# Patient Record
Sex: FEMALE | Race: WHITE | Employment: OTHER | URBAN - NONMETROPOLITAN AREA
[De-identification: names, ages, dates, MRNs, and addresses within clinical notes are randomized per-mention and may not be internally consistent; named-entity substitution may affect disease eponyms.]

---

## 2020-12-16 ENCOUNTER — OFFICE VISIT (OUTPATIENT)
Dept: OBGYN CLINIC | Age: 70
End: 2020-12-16
Payer: MEDICARE

## 2020-12-16 ENCOUNTER — HOSPITAL ENCOUNTER (OUTPATIENT)
Age: 70
Setting detail: SPECIMEN
Discharge: HOME | End: 2020-12-16
Payer: MEDICARE

## 2020-12-16 VITALS
WEIGHT: 237 LBS | BODY MASS INDEX: 38.09 KG/M2 | DIASTOLIC BLOOD PRESSURE: 74 MMHG | SYSTOLIC BLOOD PRESSURE: 108 MMHG | HEIGHT: 66 IN

## 2020-12-16 PROCEDURE — G0101 CA SCREEN;PELVIC/BREAST EXAM: HCPCS | Performed by: OBSTETRICS & GYNECOLOGY

## 2020-12-16 RX ORDER — METFORMIN HYDROCHLORIDE 500 MG/1
500 TABLET, EXTENDED RELEASE ORAL
COMMUNITY

## 2020-12-16 RX ORDER — ASPIRIN 81 MG/1
TABLET ORAL
COMMUNITY

## 2020-12-16 RX ORDER — ACETAMINOPHEN 160 MG
TABLET,DISINTEGRATING ORAL
COMMUNITY

## 2020-12-16 RX ORDER — FLUCONAZOLE 150 MG/1
150 TABLET ORAL DAILY PRN
Qty: 3 TABLET | Refills: 0 | Status: SHIPPED | OUTPATIENT
Start: 2020-12-16

## 2020-12-16 RX ORDER — PRAVASTATIN SODIUM 20 MG
20 TABLET ORAL DAILY
COMMUNITY

## 2020-12-16 RX ORDER — POLYETHYLENE GLYCOL 3350 17 G/17G
POWDER, FOR SOLUTION ORAL
COMMUNITY

## 2020-12-16 ASSESSMENT — ENCOUNTER SYMPTOMS
SHORTNESS OF BREATH: 0
ABDOMINAL PAIN: 0
CONSTIPATION: 0
DIARRHEA: 0

## 2020-12-16 NOTE — LETTER
[address] [place] [address]. [address] [place] [address]  Phone: [phone]  Fax: [phone]            December 29, 2020    Jed [place]  [address] [place] [id number]      Dear Zhang Tan: The results of your most recent Pap smear are normal. This means that no cancerous or precancerous cells were seen. We recommend that you come back in 1 year for your next routine Pap smear. If you have any questions or concerns, please don't hesitate to call.     Sincerely,        Dr. Collin Yee

## 2020-12-16 NOTE — PROGRESS NOTES
DATE OF VISIT:  20  PATIENT NAME:  Chela Manuel     YOB: 1950    78 y.o. Chief Complaint   Patient presents with    Gynecologic Exam     Pt. needs pap. Last mamm . last colonoscopy . Bone density . Pt. denies concerns. Pt. would like refill of her Diflucan medication. She only uses prn if she feels yeast infecion coming on, usually only after taking an antibiotic. No LMP recorded. Patient is postmenopausal.           Primary Care Physician: Piter Graham    The patient was seen and examined. She has no chiefcomplaint today and is here for her annual exam.  Her bowels are regular. There are no voiding complaints. She denies any bloating. She denies vaginal discharge and was counseled on STD's and the need for barriercontraception.      HPI : Chela Manuel is a 78 y.o. female  who presents today for her annual.    ______________________________________________________________________    OB History    Para Term  AB Living   2 2       2   SAB TAB Ectopic Molar Multiple Live Births             2      # Outcome Date GA Lbr Florentin/2nd Weight Sex Delivery Anes PTL Lv   2 Para            1 Para              Past Medical History:   Diagnosis Date    Acid reflux     Atrial fibrillation (HCC)     Diabetes mellitus ([address]     Hyperlipidemia     Insomnia     uses c-pap  machine    Osteoporosis                                                                    Past Surgical History:   Procedure Laterality Date    BREAST SURGERY Right ,     cystectomy     SECTION      COLONOSCOPY      KNEE ARTHROSCOPY Right 2019    KNEE SURGERY  1998    ACL    TONSILLECTOMY      TUBAL LIGATION       Family History   Problem Relation Age of Onset    Diabetes Paternal Grandfather     Stroke Paternal Grandfather     Heart Disease Father     Kidney Cancer Father     Alzheimer's Disease Mother      Social History     Socioeconomic History  Marital status: Unknown     Spouse name: Not on file    Number of children: Not on file    Years of education: Not on file    Highest education level: Not on file   Occupational History    Not on file   Social Needs    Financial resource strain: Not on file    Food insecurity     Worry: Not on file     Inability: Not on file    Transportation needs     Medical: Not on file     Non-medical: Not on file   Tobacco Use    Smoking status: Never Smoker    Smokeless tobacco: Never Used   Substance and Sexual Activity    Alcohol use: Not on file    Drug use: Never    Sexual activity: Not on file   Lifestyle    Physical activity     Days per week: Not on file     Minutes per session: Not on file    Stress: Not on file   Relationships    Social connections     Talks on phone: Not on file     Gets together: Not on file     Attends Voodoo service: Not on file     Active member of club or organization: Not on file     Attends meetings of clubs or organizations: Not on file     Relationship status: Not on file    Intimate partner violence     Fear of current or ex partner: Not on file     Emotionally abused: Not on file     Physically abused: Not on file     Forced sexual activity: Not on file   Other Topics Concern    Not on file   Social History Narrative    Not on file     Vitals:    12/16/20 1359   BP: 108/74   Site: Right Upper Arm   Position: Sitting   Weight: 237 lb (107.5 kg)   Height: 5' 6\" (1.676 m)     Body mass index is 38.25 kg/m². No LMP recorded.  Patient is postmenopausal.    MEDICATIONS:  Current Outpatient Medications   Medication Sig Dispense Refill    PROPRANOLOL HCL PO Take by mouth      polyethylene glycol (GLYCOLAX) 17 GM/SCOOP powder POLYETHYLENE GLYCOL 3350 Miralax 17 gram/dose oral powder take 17 gram mixed with 8 oz. water, juice, soda, coffee or tea by oral route once daily   [facility] ([id number])      PANTOPRAZOLE SODIUM PO Take by mouth  pravastatin (PRAVACHOL) 20 MG tablet Take 20 mg by mouth daily      aspirin 81 MG EC tablet Aspirin aspirin 81 mg oral tablet,delayed release (DR/EC) take 1 tablet (81 mg) by oral route once daily   [facility] ([id number])      metFORMIN (GLUCOPHAGE-XR) 500 MG extended release tablet Take 500 mg by mouth daily (with breakfast)      Alendronate Sodium (FOSAMAX PO) Take by mouth      MULTIPLE VITAMIN PO One-A-Day Womens Formula 18 mg iron-400 mcg-500 mg Ca oral tablet One-A-Day Womens Formula 18 mg iron-400 mcg-500 mg Ca oral tablet take 1 tablet by oral route daily   [facility] ([id number])      Cholecalciferol (VITAMIN D3) 50 MCG (2000 UT) CAPS Take by mouth      fluconazole (DIFLUCAN) 150 MG tablet Take 1 tablet by mouth daily as needed (after antibiotics) 3 tablet 0     No current facility-administered medications for this visit. ALLERGIES:  Allergies as of 12/16/2020 - Review Complete 12/16/2020   Allergen Reaction Noted    Albuterol  12/16/2020    Metoprolol  12/16/2020    Other  12/11/2020    Verapamil  12/16/2020           Symptoms of decreased mood absent    **If either question is answered in a  positive fashion then completethe PHQ9 Scoring Evaluation and make the appropriate referral**      Gynecologic History:       No LMP recorded. Patient is postmenopausal.    Sexually Active: No    STD History: No     Hormone Replacement Exposure: No      Genetic Qualified Family History of Breast, Ovarian , Colon or Uterine Cancer:No   If YES see scanned worksheet. Preventative Health Testing:  Colposcopy History:   Date of Last Mammogram: 2020  Date of Last Colonoscopy:   Date of Last Bone Density:      ______________________________________________________________________    REVIEW OF SYSTEMS:       Review of Systems   Constitutional: Negative for chills, fatigue and fever. Respiratory: Negative for shortness of breath. Return in about 2 years (around 12/16/2022) for annual.  No orders of the defined types were placed in this encounter. Repeat Annual every 1 year  Cervical Cytology Evaluation begins at 24years old. If Negative Cytology, Follow-up screening per current guidelines. Mammograms every 1year. If 35 yo and last mammogram was negative. Calcium and Vitamin D dosing reviewed. Colonoscopy screening reviewed as well as onset for bone density testing. Birth control and barrier recommendationsdiscussed. STD counseling and prevention reviewed. Gardisil counseling completed for all patients 7-33 yo. Routine healthmaintenance per patients PCP.     Electronicallysigned by:  Emmanuel Cummins DO on 12/16/20

## 2020-12-21 LAB
HPV SOURCE: NORMAL
HPV, GENOTYPE 16: NOT DETECTED
HPV, GENOTYPE 18: NOT DETECTED
HPV, HIGH RISK OTHER: NOT DETECTED

## 2020-12-29 LAB — CYTOLOGY REPORT: NORMAL

## 2023-01-04 ENCOUNTER — OFFICE VISIT (OUTPATIENT)
Dept: OBGYN CLINIC | Age: 73
End: 2023-01-04
Payer: MEDICARE

## 2023-01-04 VITALS
WEIGHT: 250 LBS | BODY MASS INDEX: 40.18 KG/M2 | HEIGHT: 66 IN | SYSTOLIC BLOOD PRESSURE: 122 MMHG | DIASTOLIC BLOOD PRESSURE: 76 MMHG

## 2023-01-04 DIAGNOSIS — Z01.419 WOMEN'S ANNUAL ROUTINE GYNECOLOGICAL EXAMINATION: Primary | ICD-10-CM

## 2023-01-04 PROCEDURE — G8419 CALC BMI OUT NRM PARAM NOF/U: HCPCS | Performed by: OBSTETRICS & GYNECOLOGY

## 2023-01-04 PROCEDURE — G0101 CA SCREEN;PELVIC/BREAST EXAM: HCPCS | Performed by: OBSTETRICS & GYNECOLOGY

## 2023-01-04 PROCEDURE — G8427 DOCREV CUR MEDS BY ELIG CLIN: HCPCS | Performed by: OBSTETRICS & GYNECOLOGY

## 2023-01-04 RX ORDER — FLUCONAZOLE 150 MG/1
150 TABLET ORAL DAILY PRN
Qty: 3 TABLET | Refills: 0 | Status: SHIPPED | OUTPATIENT
Start: 2023-01-04

## 2023-01-04 ASSESSMENT — ENCOUNTER SYMPTOMS
SHORTNESS OF BREATH: 0
CONSTIPATION: 0
ABDOMINAL PAIN: 0
DIARRHEA: 0

## 2023-01-04 NOTE — PROGRESS NOTES
YEARLY PHYSICAL    Date of service: 2023    Arleth Souza  Is a 81 y.o.   female    PT's PCP is: Hendrick Meckel     : 1950                                         Chaperone for Intimate Exam  Chaperone was offered as part of the rooming process. Patient declined and agrees to continue with exam without a chaperone. Chaperone: N/A      Subjective:       No LMP recorded.  Patient is postmenopausal.     Are your menses regular: not applicable    OB History    Para Term  AB Living   2 2       2   SAB IAB Ectopic Molar Multiple Live Births             2      # Outcome Date GA Lbr Florentin/2nd Weight Sex Delivery Anes PTL Lv   2 Para            1 Para                 Social History     Tobacco Use   Smoking Status Never   Smokeless Tobacco Never        Social History     Substance and Sexual Activity   Alcohol Use None       Family History   Problem Relation Age of Onset    Diabetes Paternal Grandfather     Stroke Paternal Grandfather     Heart Disease Father     Kidney Cancer Father     Alzheimer's Disease Mother        Any family history of breast or ovarian cancer: No    Any family history of blood clots: No      Allergies: Albuterol, Metoprolol, Other, and Verapamil      Current Outpatient Medications:     BLACK COHOSH PO, Take by mouth, Disp: , Rfl:     fluconazole (DIFLUCAN) 150 MG tablet, Take 1 tablet by mouth daily as needed (after antibiotics), Disp: 3 tablet, Rfl: 0    PROPRANOLOL HCL PO, Take by mouth, Disp: , Rfl:     polyethylene glycol (GLYCOLAX) 17 GM/SCOOP powder, POLYETHYLENE GLYCOL 3350 Miralax 17 gram/dose oral powder take 17 gram mixed with 8 oz. water, juice, soda, coffee or tea by oral route once daily   [facility] ([id number]), Disp: , Rfl:     PANTOPRAZOLE SODIUM PO, Take by mouth, Disp: , Rfl:     pravastatin (PRAVACHOL) 20 MG tablet, Take 20 mg by mouth daily, Disp: , Rfl: aspirin 81 MG EC tablet, Aspirin aspirin 81 mg oral tablet,delayed release (DR/EC) take 1 tablet (81 mg) by oral route once daily   [facility] ([id number]), Disp: , Rfl:     metFORMIN (GLUCOPHAGE-XR) 500 MG extended release tablet, Take 500 mg by mouth daily (with breakfast), Disp: , Rfl:     Alendronate Sodium (FOSAMAX PO), Take by mouth, Disp: , Rfl:     MULTIPLE VITAMIN PO, One-A-Day Womens Formula 18 mg iron-400 mcg-500 mg Ca oral tablet One-A-Day Womens Formula 18 mg iron-400 mcg-500 mg Ca oral tablet take 1 tablet by oral route daily   [facility] ([id number]), Disp: , Rfl:     Cholecalciferol (VITAMIN D3) 50 MCG (2000) CAPS, Take by mouth, Disp: , Rfl:     Social History     Substance and Sexual Activity   Sexual Activity Not on file       Any bleeding or pain with intercourse: na    Last Yearly:  2020    Last pap: 2020 NL    Last HPV: 2020 NEG    Last Mammogram: 10-6-2022    Last Dexascan     Last colorectal screen- type:colonoscopy  date  2018    Do you do self breast exams: No    Past Medical History:   Diagnosis Date    Acid reflux     Atrial fibrillation ([address]     Diabetes mellitus ([address]     History of DVT (deep vein thrombosis)     post-op knee surgery    Hyperlipidemia     Insomnia     uses c-pap  machine    Osteoporosis        Past Surgical History:   Procedure Laterality Date    BREAST SURGERY Right ,     cystectomy     SECTION      COLONOSCOPY      KNEE ARTHROSCOPY Right 2019    KNEE SURGERY  1998    ACL    TONSILLECTOMY      TUBAL LIGATION         Family History   Problem Relation Age of Onset    Diabetes Paternal Grandfather     Stroke Paternal Grandfather     Heart Disease Father     Kidney Cancer Father     Alzheimer's Disease Mother        Chief Complaint   Patient presents with    Annual Exam     Patient denies concerns.  Patient requests refills of Diflucan rx #3 to keep on hand to use after an antibiotic. PE:  Vital Signs  Blood pressure 122/76, height 5' 5.5\" (1.664 m), weight 250 lb (113.4 kg). Estimated body mass index is 40.97 kg/m² as calculated from the following:    Height as of this encounter: 5' 5.5\" (1.664 m). Weight as of this encounter: 250 lb (113.4 kg). Labs:    No results found for this visit on 01/04/23. No data recorded    NURSE: Laurne Lowery    HPI: here for annual exam    Review of Systems   Constitutional:  Negative for chills, fatigue and fever. Respiratory:  Negative for shortness of breath. Cardiovascular:  Negative for chest pain. Gastrointestinal:  Negative for abdominal pain, constipation and diarrhea. Genitourinary:  Negative for dysuria, enuresis, frequency, menstrual problem, pelvic pain, urgency and vaginal bleeding. Neurological:  Negative for dizziness, light-headedness and headaches. Physical Exam  Constitutional:       Appearance: Normal appearance. Genitourinary:      Vulva, bladder and urethral meatus normal.      Right Labia: No rash or lesions. Left Labia: No lesions or rash. No labial fusion noted. No vaginal discharge. No vaginal prolapse present. No vaginal atrophy present. Right Adnexa: not tender and no mass present. Left Adnexa: not tender and no mass present. No cervical motion tenderness, discharge, friability or lesion. No parametrium nodularity present. Uterus is not enlarged or tender. Breasts:     Breasts are soft. Right: No inverted nipple, mass, nipple discharge, skin change or tenderness. Left: No inverted nipple, mass, nipple discharge, skin change or tenderness. HENT:      Head: Normocephalic and atraumatic. Eyes:      Extraocular Movements: Extraocular movements intact. Pupils: Pupils are equal, round, and reactive to light. Cardiovascular:      Rate and Rhythm: Normal rate.    Pulmonary:      Effort: Pulmonary effort is normal.   Abdominal: General: There is no distension. Palpations: Abdomen is soft. There is no mass. Tenderness: There is no abdominal tenderness. Musculoskeletal:         General: Normal range of motion. Cervical back: Normal range of motion. Neurological:      General: No focal deficit present. Mental Status: She is alert and oriented to person, place, and time. Skin:     General: Skin is warm and dry. Psychiatric:         Mood and Affect: Mood normal.         Behavior: Behavior normal.         Thought Content: Thought content normal.         Judgment: Judgment normal.                                 Assessment and Plan          Diagnosis Orders   1. Women's annual routine gynecological examination            Repeat Annual every 1 year  Cervical Cytology Evaluation begins at 24years old. If Negative Cytology, Follow-up screening per current guidelines. Mammograms every 1year. If 37 yo and last mammogram was negative. Routine healthmaintenance per patients PCP. I am having Arleth Souza maintain her PROPRANOLOL HCL PO, polyethylene glycol, PANTOPRAZOLE SODIUM PO, pravastatin, aspirin, metFORMIN, Alendronate Sodium (FOSAMAX PO), MULTIPLE VITAMIN PO, Vitamin D3, BLACK COHOSH PO, and fluconazole. Return in about 2 years (around 1/4/2025) for annual.    She was also counseled on her preventative health maintenance recommendations and follow-up. There are no Patient Instructions on file for this visit.     Brook Elizondo DO,1/4/2023 1:22 PM

## 2023-03-06 ENCOUNTER — TELEPHONE (OUTPATIENT)
Dept: OBGYN CLINIC | Age: 73
End: 2023-03-06

## 2023-03-06 NOTE — TELEPHONE ENCOUNTER
Patient called. She was given 3 Diflucan at her appointment in Jan.  She has used all of them due to antibiotics she was on for bronchitis . She is requesting refills with a stronger dose because they did not help. Advised patient that we needed to schedule an appointment to evaluate before giving more, especially since medication not working to make sure right thing is being treated. Patient really didn't want to come in but scheduled for tomorrow with Paulina Jarrett.

## 2023-03-07 ENCOUNTER — OFFICE VISIT (OUTPATIENT)
Dept: OBGYN CLINIC | Age: 73
End: 2023-03-07

## 2023-03-07 ENCOUNTER — HOSPITAL ENCOUNTER (OUTPATIENT)
Age: 73
Setting detail: SPECIMEN
Discharge: HOME | End: 2023-03-07

## 2023-03-07 VITALS — WEIGHT: 241.6 LBS | SYSTOLIC BLOOD PRESSURE: 130 MMHG | DIASTOLIC BLOOD PRESSURE: 76 MMHG | BODY MASS INDEX: 39.59 KG/M2

## 2023-03-07 DIAGNOSIS — N89.8 VAGINAL ITCHING: Primary | ICD-10-CM

## 2023-03-07 RX ORDER — FLUCONAZOLE 150 MG/1
150 TABLET ORAL DAILY PRN
Qty: 3 TABLET | Refills: 0 | Status: SHIPPED | OUTPATIENT
Start: 2023-03-07

## 2023-03-07 RX ORDER — CLOBETASOL PROPIONATE 0.5 MG/G
CREAM TOPICAL
Qty: 45 G | Refills: 1 | Status: SHIPPED | OUTPATIENT
Start: 2023-03-07

## 2023-03-08 DIAGNOSIS — N89.8 VAGINAL ITCHING: ICD-10-CM

## 2023-03-08 LAB
CANDIDA SPECIES, DNA PROBE: NEGATIVE
GARDNERELLA VAGINALIS, DNA PROBE: NEGATIVE
SOURCE: NORMAL
TRICHOMONAS VAGINALIS DNA: NEGATIVE

## 2023-03-10 NOTE — PROGRESS NOTES
DATE OF VISIT:  3/10/23    PATIENT NAME:  Verónica Rosa     YOB: 1950    REASON FOR VISIT:    Chief Complaint   Patient presents with    Vaginal Itching     Was recently treated with antibiotic and she thinks she now has a yeast infection. Having issues with vaginal itching and slight odor. Denies discharge. HISTORY OF PRESENT ILLNESS:  Patient presents with vaginal itching. She typically gets yeast infections after ABX. Recently took ABX and then took 1 diflucan x 3 days and itching has persisted. Also has noticed slight odor. Reports that itching is actually slightly better today. Discussed that if itching were from yeast it would have likely cleared with Diflucan. Discussed trial of clobetasol cream to help clear up irritation. She also requests another RX of Diflucan to have on hand in case she is placed on another ABX. No LMP recorded. Patient is postmenopausal.  Vitals:    03/07/23 1234   BP: 130/76   Weight: 241 lb 9.6 oz (109.6 kg)     Body mass index is 39.59 kg/m². Allergies   Allergen Reactions    Albuterol     Metoprolol      Other reaction(s): severe constipation, severe constipation    Other      darvocet    Verapamil      Other reaction(s): severe constipation, severe constipation     Current Outpatient Medications   Medication Sig Dispense Refill    cyanocobalamin 1000 MCG tablet Take by mouth      fluconazole (DIFLUCAN) 150 MG tablet Take 1 tablet by mouth daily as needed (after antibiotics) 3 tablet 0    clobetasol (TEMOVATE) 0.05 % cream Apply topically 2 times daily for 2 weeks, then once daily for 2 weeks, then 2-3 times weekly.  45 g 1    BLACK COHOSH PO Take by mouth      PROPRANOLOL HCL PO Take by mouth      polyethylene glycol (GLYCOLAX) 17 GM/SCOOP powder POLYETHYLENE GLYCOL 3350 Miralax 17 gram/dose oral powder take 17 gram mixed with 8 oz. water, juice, soda, coffee or tea by oral route once daily   [facility] ([id number]) PANTOPRAZOLE SODIUM PO Take by mouth      pravastatin (PRAVACHOL) 20 MG tablet Take 20 mg by mouth daily      aspirin 81 MG EC tablet Aspirin aspirin 81 mg oral tablet,delayed release (DR/EC) take 1 tablet (81 mg) by oral route once daily   [facility] ([id number])      metFORMIN (GLUCOPHAGE-XR) 500 MG extended release tablet Take 500 mg by mouth daily (with breakfast)      Alendronate Sodium (FOSAMAX PO) Take by mouth      MULTIPLE VITAMIN PO One-A-Day Womens Formula 18 mg iron-400 mcg-500 mg Ca oral tablet One-A-Day Womens Formula 18 mg iron-400 mcg-500 mg Ca oral tablet take 1 tablet by oral route daily   [facility] ([id number])      Cholecalciferol (VITAMIN D3) 50 MCG (2000 UT) CAPS Take by mouth       No current facility-administered medications for this visit. Social History     Socioeconomic History    Marital status: Unknown   Tobacco Use    Smoking status: Never    Smokeless tobacco: Never   Substance and Sexual Activity    Drug use: Never       REVIEW OF SYSTEMS:  Review of Systems   Constitutional:  Negative for chills, fatigue and fever. Genitourinary:  Negative for dysuria, frequency, menstrual problem, pelvic pain, vaginal bleeding, vaginal discharge and vaginal pain (+itching). PHYSICAL EXAM:  /76   Wt 241 lb 9.6 oz (109.6 kg)   BMI 39.59 kg/m²   Physical Exam  Constitutional:       Appearance: Normal appearance. Genitourinary:      Right Labia: No rash, tenderness or lesions. Left Labia: No tenderness, lesions or rash. Vaginal erythema present. No vaginal discharge, tenderness or bleeding. HENT:      Head: Normocephalic and atraumatic. Mouth/Throat:      Mouth: Mucous membranes are moist.   Eyes:      Extraocular Movements: Extraocular movements intact. Musculoskeletal:         General: Normal range of motion. Cervical back: Normal range of motion. Neurological:      General: No focal deficit present. Mental Status: She is alert and oriented to person, place, and time. Skin:     General: Skin is warm and dry. Psychiatric:         Mood and Affect: Mood normal.         Behavior: Behavior normal.         Thought Content: Thought content normal.     The patient, Karen Katz is a 81 y.o. female, was seen with a total time spent of 20 minutes for the visit on this date of service by the E/M provider. The time component had both face to face and non face to face time spent in determining the total time component. Counseling and education regarding her diagnosis listed below and her options regarding those diagnoses were also included in determining her time component. The patient had her preventative health maintenance recommendations and follow-up reviewed with her at the completion of her visit. ASSESSMENT:  1. Vaginal itching        PLAN:  Orders Placed This Encounter   Procedures    Vaginitis DNA Probe     Return if symptoms worsen or fail to improve.        Electronically signed by Meet Adams PA-C on 03/10/23

## 2025-02-18 RX ORDER — PROPRANOLOL HYDROCHLORIDE 10 MG/1
10 TABLET ORAL 2 TIMES DAILY
COMMUNITY

## 2025-02-18 RX ORDER — PANTOPRAZOLE SODIUM 40 MG/1
40 TABLET, DELAYED RELEASE ORAL DAILY
COMMUNITY

## 2025-02-18 RX ORDER — ALENDRONATE SODIUM 70 MG/1
70 TABLET ORAL
COMMUNITY

## 2025-02-18 RX ORDER — ASPIRIN 81 MG/1
81 TABLET ORAL DAILY
COMMUNITY

## 2025-02-18 NOTE — PROGRESS NOTES
NPO after midnight   Bring eye drops that were prescribed for surgery  Bring insurance info and drivers license  Wear comfortable clean clothing, button down top  Do not bring jewelry  Shower night before and morning of surgery with a liquid antibacterial soap  Bring list of medications with dosage and how often taken  Follow all instructions given by your physician   needed at discharge  Call [phone] for any questions

## 2025-02-18 NOTE — PROGRESS NOTES
In preparation for their surgical procedure above patient was screened for Obstructive Sleep Apnea (TOÑITO) using the STOP-Bang Questionnaire by the Pre-Admission Testing department.  This is a pre-surgical screening tool for patient safety and serves as a recommendation, this WILL NOT cause cancellation of surgery.    STOP-Bang Questionnaire  * Do you currently see a pulmonologist?  No     If yes STOP, do not complete.  Patient follows with Dr.     1.  Do you snore loudly (able to be heard in the next room)?      No    2.  Do you often feel tired or sleepy during the daytime?          No       3.  Has anyone ever told you that you stop breathing during your sleep?       No    4.  Do you have or are you being treated for high blood pressure?          No      5.  BMI more than 35?  BMI (Calculated): 40.6        Yes    6.  Age over 50 years? 74 y.o.      Yes    7.  Neck Circumference greater than 17 inches for male or 16 inches for female?  Measured           (visits only)            Not Applicable    8.  Gender Male?                 No      TOTAL SCORE: 2    TOÑITO - Low Risk : Yes to 0 - 2 questions  TOÑITO - Intermediate Risk : Yes to 3 - 4 questions  TOÑITO - High Risk : Yes to 5 - 8 questions    Adapted from:   STOP Questionnaire: A Tool to Screen Patients for Obstructive Sleep Apnea   JAY Garrett.R.C.P.C., Cheng Avila M.B.B.S., Daniel Haines M.D., Lali Whelan, Ph.D., SANDRA Lopez.B.B.S., SANDRA Sandy.Sc., Dalton Mendenhall M.D., Sean Rich F.R.C.P.C.   Anesthesiology 2008; 108:812-21 Copyright 2008, the American Society of Anesthesiologists, Inc. [employer]   ----------------------------------------------------------------------------------------------------------------

## 2025-02-21 NOTE — DISCHARGE INSTRUCTIONS
Cataract Post-Operative Care Instructions     How to Instill you Eye Drops:    Wash your hands before instilling drops   Shake eye drop bottle putting one drop in the surgical eye   The dropper tip should not touch the eyelashes or the eye.  Your head should be tilted back, look up and lower the eyelid pulled down from the cheekbone to form a pocket for the eye drop.       Daily Eye Drop Schedule:    Remove the eye patch as directed by the recovery room nurse.  Apply 1 drop of Ofloxacin three times today, then four times a day starting tomorrow for one week.  Lay a warm, clean and moist washcloth for 5 minutes on operative eye.   Apply 1 drop of Lotemax two times today, and then starting tomorrow three times a day for one week, two times a day for one week and then once a day for one week.  Lay a warm, clean and moist washcloth for 5 minutes on operative eye.    Apply 1 drop of Prolensa one time a day starting tomorrow for two weeks.  Lay a warm, clean and moist washcloth for 5 minutes on operative eye.      Care at Home:     Wear a shield at bedtime for one week following your eye surgery.   NEVER RUB YOUR OPERATIVE EYE!  Use of the operative eye is NOT harmful.   Your vision may be blurry with your present glasses.  Take your glasses to your follow-up appointment the day after surgery.  You will receive your new glasses prescription approximately 4-5 weeks following surgery.    You may do everything to care for yourself.   You may sleep on your back or either side after surgery, but NOT FACE DOWN ON YOUR STOMACH.    NO LIFTING OVER 10 POUNDS.   No outdoor work until your doctor tells you that it is OK.   Light work, including stooping over is not harmful.   If you have glaucoma, continue your normal use of the glaucoma drops.   Do not submerge head under water (Hot tub/swimming pool)    Please call office if any problem arise at [phone] [phone].    I have had the opportunity to ask questions and have

## 2025-02-24 ENCOUNTER — ANESTHESIA (OUTPATIENT)
Dept: OPERATING ROOM | Age: 75
End: 2025-02-24
Payer: MEDICARE

## 2025-02-24 ENCOUNTER — ANESTHESIA EVENT (OUTPATIENT)
Dept: OPERATING ROOM | Age: 75
End: 2025-02-24
Payer: MEDICARE

## 2025-02-24 ENCOUNTER — HOSPITAL ENCOUNTER (OUTPATIENT)
Age: 75
Setting detail: OUTPATIENT SURGERY
Discharge: HOME | End: 2025-02-24
Attending: OPHTHALMOLOGY | Admitting: OPHTHALMOLOGY
Payer: MEDICARE

## 2025-02-24 VITALS
RESPIRATION RATE: 16 BRPM | DIASTOLIC BLOOD PRESSURE: 63 MMHG | BODY MASS INDEX: 40.29 KG/M2 | SYSTOLIC BLOOD PRESSURE: 130 MMHG | TEMPERATURE: 96.3 F | WEIGHT: 236 LBS | HEIGHT: 64 IN | HEART RATE: 65 BPM | OXYGEN SATURATION: 98 %

## 2025-02-24 LAB — GLUCOSE BLD STRIP.AUTO-MCNC: 130 MG/DL (ref 70–108)

## 2025-02-24 PROCEDURE — 6360000002 HC RX W HCPCS: Performed by: NURSE ANESTHETIST, CERTIFIED REGISTERED

## 2025-02-24 PROCEDURE — 7100000011 HC PHASE II RECOVERY - ADDTL 15 MIN: Performed by: OPHTHALMOLOGY

## 2025-02-24 PROCEDURE — V2632 POST CHMBR INTRAOCULAR LENS: HCPCS | Performed by: OPHTHALMOLOGY

## 2025-02-24 PROCEDURE — 2709999900 HC NON-CHARGEABLE SUPPLY: Performed by: OPHTHALMOLOGY

## 2025-02-24 PROCEDURE — 3600000013 HC SURGERY LEVEL 3 ADDTL 15MIN: Performed by: OPHTHALMOLOGY

## 2025-02-24 PROCEDURE — 3600000003 HC SURGERY LEVEL 3 BASE: Performed by: OPHTHALMOLOGY

## 2025-02-24 PROCEDURE — 6360000002 HC RX W HCPCS: Performed by: OPHTHALMOLOGY

## 2025-02-24 PROCEDURE — 3700000000 HC ANESTHESIA ATTENDED CARE: Performed by: OPHTHALMOLOGY

## 2025-02-24 PROCEDURE — 2500000003 HC RX 250 WO HCPCS: Performed by: OPHTHALMOLOGY

## 2025-02-24 PROCEDURE — 7100000010 HC PHASE II RECOVERY - FIRST 15 MIN: Performed by: OPHTHALMOLOGY

## 2025-02-24 PROCEDURE — 3700000001 HC ADD 15 MINUTES (ANESTHESIA): Performed by: OPHTHALMOLOGY

## 2025-02-24 PROCEDURE — 6370000000 HC RX 637 (ALT 250 FOR IP): Performed by: OPHTHALMOLOGY

## 2025-02-24 PROCEDURE — 82948 REAGENT STRIP/BLOOD GLUCOSE: CPT

## 2025-02-24 DEVICE — LENS CLAREON IOL 15.5D: Type: IMPLANTABLE DEVICE | Site: EYE | Status: FUNCTIONAL

## 2025-02-24 RX ORDER — MIDAZOLAM HYDROCHLORIDE 1 MG/ML
INJECTION, SOLUTION INTRAMUSCULAR; INTRAVENOUS
Status: DISCONTINUED | OUTPATIENT
Start: 2025-02-24 | End: 2025-02-24 | Stop reason: SDUPTHER

## 2025-02-24 RX ORDER — LIDOCAINE HYDROCHLORIDE 10 MG/ML
INJECTION, SOLUTION EPIDURAL; INFILTRATION; INTRACAUDAL; PERINEURAL PRN
Status: DISCONTINUED | OUTPATIENT
Start: 2025-02-24 | End: 2025-02-24 | Stop reason: ALTCHOICE

## 2025-02-24 RX ORDER — FENTANYL CITRATE 50 UG/ML
INJECTION, SOLUTION INTRAMUSCULAR; INTRAVENOUS
Status: DISCONTINUED | OUTPATIENT
Start: 2025-02-24 | End: 2025-02-24 | Stop reason: SDUPTHER

## 2025-02-24 RX ORDER — BUPIVACAINE HYDROCHLORIDE 7.5 MG/ML
1 INJECTION, SOLUTION EPIDURAL; RETROBULBAR
Status: COMPLETED | OUTPATIENT
Start: 2025-02-24 | End: 2025-02-24

## 2025-02-24 RX ORDER — SODIUM CHLORIDE 0.9 % (FLUSH) 0.9 %
5-40 SYRINGE (ML) INJECTION 2 TIMES DAILY
Status: DISCONTINUED | OUTPATIENT
Start: 2025-02-24 | End: 2025-02-24 | Stop reason: HOSPADM

## 2025-02-24 RX ORDER — BALANCED SALT SOLUTION 6.4; .75; .48; .3; 3.9; 1.7 MG/ML; MG/ML; MG/ML; MG/ML; MG/ML; MG/ML
SOLUTION OPHTHALMIC PRN
Status: DISCONTINUED | OUTPATIENT
Start: 2025-02-24 | End: 2025-02-24 | Stop reason: ALTCHOICE

## 2025-02-24 RX ADMIN — BUPIVACAINE HYDROCHLORIDE 0.38 MG: 7.5 INJECTION, SOLUTION EPIDURAL; RETROBULBAR at 07:46

## 2025-02-24 RX ADMIN — Medication 1 DROP: at 07:46

## 2025-02-24 RX ADMIN — Medication 1 DROP: at 07:51

## 2025-02-24 RX ADMIN — Medication 1 DROP: at 07:56

## 2025-02-24 RX ADMIN — FENTANYL CITRATE 50 MCG: 50 INJECTION, SOLUTION INTRAMUSCULAR; INTRAVENOUS at 08:49

## 2025-02-24 RX ADMIN — MIDAZOLAM 2 MG: 1 INJECTION INTRAMUSCULAR; INTRAVENOUS at 08:49

## 2025-02-24 ASSESSMENT — PAIN - FUNCTIONAL ASSESSMENT
PAIN_FUNCTIONAL_ASSESSMENT: 0-10
PAIN_FUNCTIONAL_ASSESSMENT: NONE - DENIES PAIN

## 2025-02-24 NOTE — PROGRESS NOTES
1 DROP PROPARACAINE TO OPERATIVE EYE PRIOR TO PREP     0.1ML VIGAMOX TO LEFT EYE AT END OF PROCEDURE BY DR. ZAMORA     1 DROP MOXIFLOXACIN AND 1 DROP LOTEMAX TO LEFT EYE AT END OF PROCEDURE     CDE 3.58       EYE SHIELD SECURED OVER OPERATIVE EYE WITH PAPER TAPE

## 2025-02-24 NOTE — OP NOTE
[facility]  RECORD OF OPERATION                       2025    Patient: Kim Mast  : 1950  Acct#: [id number]    PRE-OPERATIVE DIAGNOSIS:  Cataract, OS    POST-OPERATIVE DIAGNOSIS:  same    OPERATION PERFORMED:  Phacoemulsification cataract extraction with posterior chamber intraocular lens, OS    MODIFIERS: None    SURGEON:  Pepe Lopez MD    ANESTHESIA:  Topical/MAC    COMPLICATIONS:  None    LENS INFORMATION:SY60WF +15.5 (SN:[id number])    CDE: 3.58    INDICATION AND CONSENT:  The patient was found to have a visually significant cataract affecting their activities of daily living which is not adequately correctable by a change in spectacles.  The risks and options of cataract surgery including observation were discussed.  Consent was obtained and the patient requests to proceed.    OPERATIVE TECHNIQUE: In the preoperative area, the patient was prepared for surgery including dilation of the operative eye.  The patient was then taken to the operating room, the operative eye was prepped and draped in the usual sterile ophthalmic fashion.  A final timeout was performed to confirm the correct patient, site, side, lens, and procedure.  A lid speculum was inserted.  A paracentesis incision was made at the limbus in a position comfortable for the non-dominate hand.  0.2-0.4cc of 1% lidocaine was instilled into the anterior chamber followed by 0.2-0.4cc of Omidria. Viscoelastic was instilled into the anterior chamber. A keratome was used to produce a clear corneal incision at the temporal limbus.  A capsulorrhexis-type capsulotomy was performed.  Hydrodissection was performed balanced salt solution (BSS).  The lens nucleus was phacoemulsified. The lens cortical material was aspirated using the automated irrigation/aspiration unit (I/A). Additional viscoelastic was instilled into the anterior segment and capsular bag. An intraocular lens was introduced and centered

## 2025-02-24 NOTE — PROGRESS NOTES
0911 To recovery via chair. Spont resp. VSS. Report received from surgical rn. IV capped. Denies pain or nausea. Clear eye patch in place to left eye. Snack and drink given. Call bell in reach.  in room  0925 IV discontinued. Up to dress self  0930 Discharge instructions given to pt and  with each voicing understanding.   0945 Discharge to home in stable ambulatory condition with

## 2025-02-24 NOTE — ANESTHESIA PRE PROCEDURE
Department of Anesthesiology  Preprocedure Note       Name:  Kim Mast   Age:  74 y.o.  :  1950                                          MRN:  [id number]         Date:  2025      Surgeon: Surgeon(s):  Pepe Lopez MD    Procedure: Procedure(s):  LEFT EYE CATARACT EMULSIFICATION INTRAOCULAR LENS IMPLANT    Medications prior to admission:   Prior to Admission medications    Medication Sig Start Date End Date Taking? Authorizing Provider   propranolol (INDERAL) 10 MG tablet Take 1 tablet by mouth 2 times daily   Yes Daja Fernandez MD   pantoprazole (PROTONIX) 40 MG tablet Take 1 tablet by mouth daily   Yes Daja Fernandez MD   aspirin 81 MG EC tablet Take 1 tablet by mouth daily   Yes Daja Fernandez MD   Multiple Vitamins-Minerals (MULTIVITAMIN WOMEN 50+ PO) Take by mouth daily   Yes Daja Fernandez MD   alendronate (FOSAMAX) 70 MG tablet Take 1 tablet by mouth every 7 days   Yes ProviderDaja MD   cyanocobalamin 1000 MCG tablet Take by mouth daily   Yes Daja Fernandez MD   polyethylene glycol (GLYCOLAX) 17 GM/SCOOP powder daily   Yes ProviderDaja MD   pravastatin (PRAVACHOL) 20 MG tablet Take 1 tablet by mouth at bedtime   Yes ProviderDaja MD   metFORMIN (GLUCOPHAGE-XR) 500 MG extended release tablet Take 1 tablet by mouth Daily with supper   Yes ProviderDaja MD   Cholecalciferol (VITAMIN D3) 50 MCG ( UT) CAPS Take by mouth daily   Yes ProviderDaja MD       Current medications:    Current Facility-Administered Medications   Medication Dose Route Frequency Provider Last Rate Last Admin   • sodium chloride flush 0.9 % injection 5-40 mL  5-40 mL IntraVENous BID Pepe Lopez MD           Allergies:    Allergies   Allergen Reactions   • Albuterol      \"Heart goes crazy\"   • Metoprolol      severe constipation   • Propoxyphene Napsylate [Propoxyphene]      Darvocet   • Verapamil      severe constipation       Problem List:

## 2025-02-24 NOTE — ANESTHESIA POSTPROCEDURE EVALUATION
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    Jeb Phase I:      Jeb Phase II:      Anesthesia Post Evaluation    Patient location during evaluation: bedside  Patient participation: complete - patient participated  Level of consciousness: awake and alert  Airway patency: patent  Nausea & Vomiting: no nausea and no vomiting  Cardiovascular status: hemodynamically stable  Respiratory status: acceptable  Hydration status: euvolemic    [facility]  POST-ANESTHESIA NOTE       Name:  Kim Mast                                         Age:  74 y.o.  MRN:  [id number]      Last Vitals:  BP (!) 168/74   Pulse 68   Temp 97.4 °F (36.3 °C) (Temporal)   Resp 16   Ht 1.626 m (5' 4\")   Wt 107 kg (236 lb)   SpO2 98%   BMI 40.51 kg/m²   Patient Vitals for the past 4 hrs:   BP Temp Temp src Pulse Resp SpO2 Height Weight   02/24/25 0742 (!) 168/74 97.4 °F (36.3 °C) Temporal 68 16 98 % 1.626 m (5' 4\") 107 kg (236 lb)       Level of Consciousness:  Awake    Respiratory:  Stable    Oxygen Saturation:  Stable    Cardiovascular:  Stable    Hydration:  Adequate    PONV:  Stable    Post-op Pain:  Adequate analgesia    Post-op Assessment:  No apparent anesthetic complications    Additional Follow-Up / Treatment / Comment:  None    SUKHI PÉREZ MD  February 24, 2025   9:28 AM      No notable

## 2025-02-24 NOTE — H&P
I have examined the patient and reviewed the H&P / Consult and there are no changes to the patient.    Pepe Lopez MD 2/24/20257:39 AM

## 2025-03-05 NOTE — PROGRESS NOTES
NPO after midnight   Bring eye drops that were prescribed for surgery  Bring insurance info and drivers license  Wear comfortable clean clothing, button down top  Do not bring jewelry  Shower night before and morning of surgery with a liquid antibacterial soap  Bring list of medications with dosage and how often taken  Follow all instructions given by your physician   needed at discharge  Call [phone] for any questions

## 2025-03-10 ENCOUNTER — ANESTHESIA (OUTPATIENT)
Dept: OPERATING ROOM | Age: 75
End: 2025-03-10
Payer: MEDICARE

## 2025-03-10 ENCOUNTER — HOSPITAL ENCOUNTER (OUTPATIENT)
Age: 75
Setting detail: OUTPATIENT SURGERY
Discharge: HOME | End: 2025-03-10
Attending: OPHTHALMOLOGY | Admitting: OPHTHALMOLOGY
Payer: MEDICARE

## 2025-03-10 ENCOUNTER — ANESTHESIA EVENT (OUTPATIENT)
Dept: OPERATING ROOM | Age: 75
End: 2025-03-10
Payer: MEDICARE

## 2025-03-10 VITALS
OXYGEN SATURATION: 97 % | HEART RATE: 68 BPM | WEIGHT: 239.2 LBS | SYSTOLIC BLOOD PRESSURE: 126 MMHG | BODY MASS INDEX: 39.85 KG/M2 | HEIGHT: 65 IN | RESPIRATION RATE: 16 BRPM | DIASTOLIC BLOOD PRESSURE: 58 MMHG | TEMPERATURE: 97.1 F

## 2025-03-10 LAB — GLUCOSE BLD STRIP.AUTO-MCNC: 126 MG/DL (ref 70–108)

## 2025-03-10 PROCEDURE — 2709999900 HC NON-CHARGEABLE SUPPLY: Performed by: OPHTHALMOLOGY

## 2025-03-10 PROCEDURE — 3700000001 HC ADD 15 MINUTES (ANESTHESIA): Performed by: OPHTHALMOLOGY

## 2025-03-10 PROCEDURE — 3600000013 HC SURGERY LEVEL 3 ADDTL 15MIN: Performed by: OPHTHALMOLOGY

## 2025-03-10 PROCEDURE — 3700000000 HC ANESTHESIA ATTENDED CARE: Performed by: OPHTHALMOLOGY

## 2025-03-10 PROCEDURE — 82948 REAGENT STRIP/BLOOD GLUCOSE: CPT

## 2025-03-10 PROCEDURE — 3600000003 HC SURGERY LEVEL 3 BASE: Performed by: OPHTHALMOLOGY

## 2025-03-10 PROCEDURE — 7100000011 HC PHASE II RECOVERY - ADDTL 15 MIN: Performed by: OPHTHALMOLOGY

## 2025-03-10 PROCEDURE — 6370000000 HC RX 637 (ALT 250 FOR IP): Performed by: OPHTHALMOLOGY

## 2025-03-10 PROCEDURE — 6360000002 HC RX W HCPCS: Performed by: NURSE ANESTHETIST, CERTIFIED REGISTERED

## 2025-03-10 PROCEDURE — 6360000002 HC RX W HCPCS: Performed by: OPHTHALMOLOGY

## 2025-03-10 PROCEDURE — 2500000003 HC RX 250 WO HCPCS: Performed by: OPHTHALMOLOGY

## 2025-03-10 PROCEDURE — V2632 POST CHMBR INTRAOCULAR LENS: HCPCS | Performed by: OPHTHALMOLOGY

## 2025-03-10 PROCEDURE — 7100000010 HC PHASE II RECOVERY - FIRST 15 MIN: Performed by: OPHTHALMOLOGY

## 2025-03-10 DEVICE — LENS CLAREON IOL 17.0D: Type: IMPLANTABLE DEVICE | Site: EYE | Status: FUNCTIONAL

## 2025-03-10 RX ORDER — TROPICAMIDE 10 MG/ML
1 SOLUTION/ DROPS OPHTHALMIC
Status: ACTIVE | OUTPATIENT
Start: 2025-03-10 | End: 2025-03-10

## 2025-03-10 RX ORDER — MIDAZOLAM HYDROCHLORIDE 1 MG/ML
INJECTION, SOLUTION INTRAMUSCULAR; INTRAVENOUS
Status: DISCONTINUED | OUTPATIENT
Start: 2025-03-10 | End: 2025-03-10 | Stop reason: SDUPTHER

## 2025-03-10 RX ORDER — BUPIVACAINE HYDROCHLORIDE 7.5 MG/ML
1 INJECTION, SOLUTION EPIDURAL; RETROBULBAR
Status: COMPLETED | OUTPATIENT
Start: 2025-03-10 | End: 2025-03-10

## 2025-03-10 RX ORDER — CYCLOPENTOLATE HYDROCHLORIDE 10 MG/ML
1 SOLUTION/ DROPS OPHTHALMIC
Status: ACTIVE | OUTPATIENT
Start: 2025-03-10 | End: 2025-03-10

## 2025-03-10 RX ORDER — PHENYLEPHRINE HYDROCHLORIDE 25 MG/ML
1 SOLUTION/ DROPS OPHTHALMIC
Status: ACTIVE | OUTPATIENT
Start: 2025-03-10 | End: 2025-03-10

## 2025-03-10 RX ORDER — TROPICAMIDE 10 MG/ML
1 SOLUTION/ DROPS OPHTHALMIC
Status: COMPLETED | OUTPATIENT
Start: 2025-03-10 | End: 2025-03-10

## 2025-03-10 RX ORDER — PHENYLEPHRINE HYDROCHLORIDE 25 MG/ML
1 SOLUTION/ DROPS OPHTHALMIC
Status: COMPLETED | OUTPATIENT
Start: 2025-03-10 | End: 2025-03-10

## 2025-03-10 RX ORDER — KETOROLAC TROMETHAMINE 5 MG/ML
1 SOLUTION OPHTHALMIC
Status: ACTIVE | OUTPATIENT
Start: 2025-03-10 | End: 2025-03-10

## 2025-03-10 RX ORDER — FENTANYL CITRATE 50 UG/ML
INJECTION, SOLUTION INTRAMUSCULAR; INTRAVENOUS
Status: DISCONTINUED | OUTPATIENT
Start: 2025-03-10 | End: 2025-03-10 | Stop reason: SDUPTHER

## 2025-03-10 RX ORDER — LIDOCAINE HYDROCHLORIDE 10 MG/ML
INJECTION, SOLUTION EPIDURAL; INFILTRATION; INTRACAUDAL; PERINEURAL PRN
Status: DISCONTINUED | OUTPATIENT
Start: 2025-03-10 | End: 2025-03-10 | Stop reason: ALTCHOICE

## 2025-03-10 RX ORDER — CYCLOPENTOLATE HYDROCHLORIDE 10 MG/ML
1 SOLUTION/ DROPS OPHTHALMIC
Status: COMPLETED | OUTPATIENT
Start: 2025-03-10 | End: 2025-03-10

## 2025-03-10 RX ORDER — SODIUM CHLORIDE 0.9 % (FLUSH) 0.9 %
5-40 SYRINGE (ML) INJECTION 2 TIMES DAILY
Status: DISCONTINUED | OUTPATIENT
Start: 2025-03-10 | End: 2025-03-10 | Stop reason: HOSPADM

## 2025-03-10 RX ORDER — BALANCED SALT SOLUTION 6.4; .75; .48; .3; 3.9; 1.7 MG/ML; MG/ML; MG/ML; MG/ML; MG/ML; MG/ML
SOLUTION OPHTHALMIC PRN
Status: DISCONTINUED | OUTPATIENT
Start: 2025-03-10 | End: 2025-03-10 | Stop reason: ALTCHOICE

## 2025-03-10 RX ORDER — KETOROLAC TROMETHAMINE 5 MG/ML
1 SOLUTION OPHTHALMIC
Status: COMPLETED | OUTPATIENT
Start: 2025-03-10 | End: 2025-03-10

## 2025-03-10 RX ADMIN — CYCLOPENTOLATE HYDROCHLORIDE 1 DROP: 10 SOLUTION OPHTHALMIC at 07:58

## 2025-03-10 RX ADMIN — MIDAZOLAM 2 MG: 1 INJECTION INTRAMUSCULAR; INTRAVENOUS at 08:51

## 2025-03-10 RX ADMIN — TROPICAMIDE 1 DROP: 10 SOLUTION/ DROPS OPHTHALMIC at 07:58

## 2025-03-10 RX ADMIN — KETOROLAC TROMETHAMINE 1 DROP: 0.5 SOLUTION OPHTHALMIC at 07:52

## 2025-03-10 RX ADMIN — PHENYLEPHRINE HYDROCHLORIDE 1 DROP: 25 SOLUTION/ DROPS OPHTHALMIC at 08:03

## 2025-03-10 RX ADMIN — FENTANYL CITRATE 50 MCG: 50 INJECTION, SOLUTION INTRAMUSCULAR; INTRAVENOUS at 08:51

## 2025-03-10 RX ADMIN — PHENYLEPHRINE HYDROCHLORIDE 1 DROP: 25 SOLUTION/ DROPS OPHTHALMIC at 07:58

## 2025-03-10 RX ADMIN — KETOROLAC TROMETHAMINE 1 DROP: 0.5 SOLUTION OPHTHALMIC at 08:03

## 2025-03-10 RX ADMIN — TROPICAMIDE 1 DROP: 10 SOLUTION/ DROPS OPHTHALMIC at 08:03

## 2025-03-10 RX ADMIN — BUPIVACAINE HYDROCHLORIDE 0.38 MG: 7.5 INJECTION, SOLUTION EPIDURAL; RETROBULBAR at 07:50

## 2025-03-10 RX ADMIN — PHENYLEPHRINE HYDROCHLORIDE 1 DROP: 25 SOLUTION/ DROPS OPHTHALMIC at 07:52

## 2025-03-10 RX ADMIN — CYCLOPENTOLATE HYDROCHLORIDE 1 DROP: 10 SOLUTION OPHTHALMIC at 08:03

## 2025-03-10 RX ADMIN — KETOROLAC TROMETHAMINE 1 DROP: 0.5 SOLUTION OPHTHALMIC at 07:58

## 2025-03-10 RX ADMIN — CYCLOPENTOLATE HYDROCHLORIDE 1 DROP: 10 SOLUTION OPHTHALMIC at 07:52

## 2025-03-10 RX ADMIN — TROPICAMIDE 1 DROP: 10 SOLUTION/ DROPS OPHTHALMIC at 07:53

## 2025-03-10 ASSESSMENT — PAIN - FUNCTIONAL ASSESSMENT
PAIN_FUNCTIONAL_ASSESSMENT: NONE - DENIES PAIN
PAIN_FUNCTIONAL_ASSESSMENT: 0-10

## 2025-03-10 NOTE — ANESTHESIA PRE PROCEDURE
cyclopentolate (CYCLOGYL) 1 % ophthalmic solution 1 drop  1 drop Right Eye Q5 Min Pepe Lopez MD   1 drop at 03/10/25 0758   • phenylephrine (MYDFRIN) 2.5 % ophthalmic solution 1 drop  1 drop Right Eye Q5 Min Pepe Lopez MD   1 drop at 03/10/25 0758       Allergies:    Allergies   Allergen Reactions   • Albuterol      \"Heart goes crazy\"   • Metoprolol      severe constipation   • Propoxyphene Napsylate [Propoxyphene]      Darvocet   • Verapamil      severe constipation       Problem List:  There is no problem list on file for this patient.      Past Medical History:        Diagnosis Date   • Acid reflux    • Diabetes mellitus (HCC)    • History of DVT (deep vein thrombosis)     post-op knee surgery   • Hx of blood clots     left leg after surgery   • Hyperlipidemia    • Insomnia     uses c-pap  machine   • Osteoporosis    • PONV (postoperative nausea and vomiting)    • Prolonged emergence from general anesthesia    • Tachycardia        Past Surgical History:        Procedure Laterality Date   • BREAST SURGERY Right ,     cystectomy   •  SECTION     • COLONOSCOPY     • EYE SURGERY Left 2025    LEFT EYE CATARACT EMULSIFICATION INTRAOCULAR LENS IMPLANT performed by Pepe Lopez MD at [facility]   • KNEE ARTHROSCOPY Right    • KNEE SURGERY Left     ACL   • TONSILLECTOMY     • TUBAL LIGATION         Social History:    Social History     Tobacco Use   • Smoking status: Never   • Smokeless tobacco: Never   Substance Use Topics   • Alcohol use: Never                                Counseling given: Not Answered      Vital Signs (Current):   Vitals:    03/10/25 0745   BP: (!) 147/69   Pulse: 73   Resp: 16   Temp: 96.9 °F (36.1 °C)   TempSrc: Temporal   SpO2: 95%   Weight: 108.5 kg (239 lb 3.2 oz)   Height: 1.638 m (5' 4.5\")                                              BP Readings from Last 3 Encounters:   03/10/25 (!) 147/69   25 130/63   23 130/76

## 2025-03-10 NOTE — PROGRESS NOTES
1 DROP PROPARACAINE TO RIGHT EYE PRIOR TO PREP    CDE 2.36    0.1ML VIGAMOX TO RIGHT EYE AT END OF PROCEDURE BY DR. ZAMORA    1 DROP LOTEMAX AND 1 DROP MOXIFLOXACIN TO RIGHT EYE    EYE SHIELD SECURED OVER RIGHT EYE USING PAPER TAPE

## 2025-03-10 NOTE — PROGRESS NOTES
906- Patient to Phase II via chair. Report received from OR RN. Patient drowsy but responsive.Vitals obtained and stable. Respirations even and unlabored on room air. Patient denies pain, nausea, numbness and tingling. Patient able to move all extremities. No drainage noted at injection sites. Patient instructed to stay in Chair. Instructed on call light use.     910- Pt eating and drinking at this time    922- Pt's IV removed at this time    925- This RN went over instructions w/ the pt and her  (Álvaro). Both verbalized understanding and had all questions answered at this time.    933- Pt getting changed at this time    940- Patient meets discharge criteria.  Discharged in stable condition with responsible . All belongings given to patient. Patient ambulated to car with assistance from RN. Patient tolerated well.

## 2025-03-10 NOTE — OP NOTE
[facility]  RECORD OF OPERATION                       3/10/2025    Patient: Kim Mast  : 1950  Acct#: [id number]    PRE-OPERATIVE DIAGNOSIS:  Cataract, OD    POST-OPERATIVE DIAGNOSIS:  same    OPERATION PERFORMED:  Phacoemulsification cataract extraction with posterior chamber intraocular lens, OD    MODIFIERS: None    SURGEON:  Pepe Lopez MD    ANESTHESIA:  Topical/MAC    COMPLICATIONS:  None    LENS INFORMATION:SY60WF +17.0 (SN:[id number])    CDE: 2.36    INDICATION AND CONSENT:  The patient was found to have a visually significant cataract affecting their activities of daily living which is not adequately correctable by a change in spectacles.  The risks and options of cataract surgery including observation were discussed.  Consent was obtained and the patient requests to proceed.    OPERATIVE TECHNIQUE: In the preoperative area, the patient was prepared for surgery including dilation of the operative eye.  The patient was then taken to the operating room, the operative eye was prepped and draped in the usual sterile ophthalmic fashion.  A final timeout was performed to confirm the correct patient, site, side, lens, and procedure.  A lid speculum was inserted.  A paracentesis incision was made at the limbus in a position comfortable for the non-dominate hand.  0.2-0.4cc of 1% lidocaine was instilled into the anterior chamber followed by 0.2-0.4cc of Omidria. Viscoelastic was instilled into the anterior chamber. A keratome was used to produce a clear corneal incision at the temporal limbus.  A capsulorrhexis-type capsulotomy was performed.  Hydrodissection was performed balanced salt solution (BSS).  The lens nucleus was phacoemulsified. The lens cortical material was aspirated using the automated irrigation/aspiration unit (I/A). Additional viscoelastic was instilled into the anterior segment and capsular bag. An intraocular lens was introduced and centered

## 2025-03-10 NOTE — ANESTHESIA POSTPROCEDURE EVALUATION
Department of Anesthesiology  Postprocedure Note    Patient: Kim Mast  MRN: [id number]  YOB: 1950  Date of evaluation: 3/10/2025    Procedure Summary       Date: 03/10/25 Room / Location: [address]    Anesthesia Start: 0848 Anesthesia Stop: 0858    Procedure: RIGHT EYE CATARACT EMULSIFICATION INTRAOCULAR LENS IMPLANT (Right: Eye) Diagnosis:       Combined form of age-related cataract, right eye      (Combined form of age-related cataract, right eye [H25.811])    Surgeons: Pepe Lopez MD Responsible Provider: Andrae Dexter MD    Anesthesia Type: MAC ASA Status: 3            Anesthesia Type: No value filed.    Jeb Phase I:      Jeb Phase II:      Anesthesia Post Evaluation    Patient location during evaluation: PACU  Patient participation: complete - patient participated  Level of consciousness: awake and awake and alert  Pain score: 0  Airway patency: patent  Nausea & Vomiting: no nausea and no vomiting  Cardiovascular status: blood pressure returned to baseline  Respiratory status: acceptable  Hydration status: euvolemic  Pain management: adequate        No notable events documented.

## (undated) DEVICE — Z INACTIVE USE 2735373 APPLICATOR FBR LAIN COT WOOD TIP ECONOMICAL

## (undated) DEVICE — SYRINGE, LUER SLIP, STERILE, 1ML: Brand: MEDLINE

## (undated) DEVICE — EYE PAK* 1040 PLASTIC OPHTHALMIC DRAPE INCISE POUCH: Brand: ALCON EYE-PAK

## (undated) DEVICE — SOLUTION BSS 15ML

## (undated) DEVICE — CATARACT PACK: Brand: MEDLINE INDUSTRIES, INC.

## (undated) DEVICE — MARKER,SKIN,WI/RULER AND LABELS: Brand: MEDLINE

## (undated) DEVICE — MICROSURGICAL INSTRUMENT ANTERIOR CHAMBER CANNULA 27GA: Brand: ALCON

## (undated) DEVICE — INTREPID® TRANSFORMER IA HP: Brand: INTREPID®

## (undated) DEVICE — BETADINE 5% EYE SOL

## (undated) DEVICE — MICROSURGICAL INSTRUMENT HYDRODISSECTION CANNULA 25GA, 8MM BEND: Brand: ALCON

## (undated) DEVICE — CLEARCUT® SIDEPORT KNIFE DUAL BEVEL 1.0MM ANGLED: Brand: CLEARCUT®

## (undated) DEVICE — GLOVE SURG 7.5 11.7IN BEAD CUF LIGHT BRN SENSICARE LTX FREE

## (undated) DEVICE — DRESSING TRNSPAR W2XL2.75IN FLM SHT SEMIPERMEABLE WIND

## (undated) DEVICE — CLEARCUT® HP2 SLIT KNIFE INTREPID MICRO-COAXIAL SYSTEM 2.4 DB: Brand: CLEARCUT®; INTREPID

## (undated) DEVICE — PHACOEMULSIFICATION PACK COMPACT